# Patient Record
Sex: MALE | Race: WHITE | NOT HISPANIC OR LATINO | URBAN - METROPOLITAN AREA
[De-identification: names, ages, dates, MRNs, and addresses within clinical notes are randomized per-mention and may not be internally consistent; named-entity substitution may affect disease eponyms.]

---

## 2017-12-28 ENCOUNTER — FOLLOW UP ESTABLISHED (OUTPATIENT)
Dept: URBAN - METROPOLITAN AREA CLINIC 10 | Facility: CLINIC | Age: 52
End: 2017-12-28
Payer: MEDICAID

## 2017-12-28 DIAGNOSIS — E11.3551 DIABETES MELLITUS TYPE 2 WITH STABLE PROLIFERATIVE: ICD-10-CM

## 2017-12-28 DIAGNOSIS — E11.39 TYPE 2 DIABETES MELLITUS WITH OPHTHALMIC COMPLICAT: Primary | ICD-10-CM

## 2017-12-28 DIAGNOSIS — H35.371 PUCKERING OF MACULA, RIGHT EYE: ICD-10-CM

## 2017-12-28 DIAGNOSIS — H44.522 ATROPHY OF GLOBE, LEFT EYE: ICD-10-CM

## 2017-12-28 PROCEDURE — 92134 CPTRZ OPH DX IMG PST SGM RTA: CPT | Performed by: OPTOMETRIST

## 2017-12-28 PROCEDURE — 92014 COMPRE OPH EXAM EST PT 1/>: CPT | Performed by: OPTOMETRIST

## 2017-12-28 ASSESSMENT — INTRAOCULAR PRESSURE: OD: 17

## 2017-12-28 ASSESSMENT — VISUAL ACUITY: OD: 20/40

## 2018-04-04 ENCOUNTER — FOLLOW UP ESTABLISHED (OUTPATIENT)
Dept: URBAN - METROPOLITAN AREA CLINIC 10 | Facility: CLINIC | Age: 53
End: 2018-04-04
Payer: MEDICAID

## 2018-04-04 DIAGNOSIS — E11.65 DIABETES MELLITUS TYPE 2 WITH UNCONTROLLED BLOOD S: ICD-10-CM

## 2018-04-04 PROCEDURE — 92134 CPTRZ OPH DX IMG PST SGM RTA: CPT | Performed by: OPHTHALMOLOGY

## 2018-04-04 PROCEDURE — 92004 COMPRE OPH EXAM NEW PT 1/>: CPT | Performed by: OPHTHALMOLOGY

## 2018-04-04 ASSESSMENT — INTRAOCULAR PRESSURE: OD: 16

## 2019-03-21 ENCOUNTER — FOLLOW UP ESTABLISHED (OUTPATIENT)
Dept: URBAN - METROPOLITAN AREA CLINIC 10 | Facility: CLINIC | Age: 54
End: 2019-03-21
Payer: MEDICAID

## 2019-03-21 DIAGNOSIS — E11.3521 TYPE 2 DIAB W PROLIF DIAB RTNOP W TRCTN DTCH MACULA, R EYE: Primary | ICD-10-CM

## 2019-03-21 DIAGNOSIS — Z79.4 LONG TERM (CURRENT) USE OF INSULIN: ICD-10-CM

## 2019-03-21 PROCEDURE — 92014 COMPRE OPH EXAM EST PT 1/>: CPT | Performed by: OPTOMETRIST

## 2019-03-21 PROCEDURE — 92134 CPTRZ OPH DX IMG PST SGM RTA: CPT | Performed by: OPTOMETRIST

## 2019-03-21 ASSESSMENT — KERATOMETRY: OD: 42.50

## 2019-03-21 ASSESSMENT — INTRAOCULAR PRESSURE: OD: 15

## 2019-03-21 ASSESSMENT — VISUAL ACUITY
OS: 20/NLP
OD: 20/30

## 2023-06-28 ENCOUNTER — OFFICE VISIT (OUTPATIENT)
Dept: URBAN - METROPOLITAN AREA CLINIC 10 | Facility: CLINIC | Age: 58
End: 2023-06-28
Payer: MEDICAID

## 2023-06-28 DIAGNOSIS — H17.821 PERIPHERAL OPACITY OF CORNEA OF RIGHT EYE: Primary | ICD-10-CM

## 2023-06-28 DIAGNOSIS — E11.39 TYPE 2 DIABETES MELLITUS WITH OTHER DIABETIC OPHTHALMIC COMPLICATION: ICD-10-CM

## 2023-06-28 DIAGNOSIS — E11.3521 TYPE 2 DIABETES MELLITUS WITH PROLIFERATIVE DIABETIC RETINOPATHY WITH TRACTION RETINAL DETACHMENT INVOLVING THE MACULA, RIGHT EYE: ICD-10-CM

## 2023-06-28 PROCEDURE — 92002 INTRM OPH EXAM NEW PATIENT: CPT | Performed by: OPTOMETRIST

## 2023-06-28 PROCEDURE — 92134 CPTRZ OPH DX IMG PST SGM RTA: CPT | Performed by: OPTOMETRIST

## 2023-06-28 RX ORDER — OFLOXACIN 3 MG/ML
0.3 % SOLUTION/ DROPS OPHTHALMIC
Qty: 2.5 | Refills: 1 | Status: ACTIVE
Start: 2023-06-28

## 2023-06-28 RX ORDER — VALACYCLOVIR HYDROCHLORIDE 1 G/1
TABLET, FILM COATED ORAL
Qty: 60 | Refills: 1 | Status: ACTIVE
Start: 2023-06-28

## 2023-06-28 ASSESSMENT — INTRAOCULAR PRESSURE: OD: 18

## 2023-06-28 NOTE — IMPRESSION/PLAN
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 no recurrent vit hemes in interim. mild ERM, no edema. Monitor.

## 2023-06-28 NOTE — IMPRESSION/PLAN
Impression: Peripheral opacity of cornea of right eye: H17.821. Plan: Pt. reports onset of symptoms ~3 weeks prior. Pt. is monocular. Suspicious for herpetic etiology. Begin ofloxacin tid OD. Begin Valtrex 1000mg po tid. RTC 1 week for f/u, sooner for NI or new symptoms.